# Patient Record
Sex: FEMALE | NOT HISPANIC OR LATINO
[De-identification: names, ages, dates, MRNs, and addresses within clinical notes are randomized per-mention and may not be internally consistent; named-entity substitution may affect disease eponyms.]

---

## 2017-12-26 ENCOUNTER — CHARTING TRANS (OUTPATIENT)
Dept: OTHER | Age: 22
End: 2017-12-26

## 2019-03-11 RX ORDER — MINOCYCLINE HYDROCHLORIDE 75 MG/1
CAPSULE ORAL
Qty: 60 CAPSULE | Refills: 0 | Status: SHIPPED | OUTPATIENT
Start: 2019-03-11

## 2019-10-07 ENCOUNTER — TRANSFERRED RECORDS (OUTPATIENT)
Dept: HEALTH INFORMATION MANAGEMENT | Facility: CLINIC | Age: 24
End: 2019-10-07

## 2019-10-09 ENCOUNTER — TRANSFERRED RECORDS (OUTPATIENT)
Dept: HEALTH INFORMATION MANAGEMENT | Facility: CLINIC | Age: 24
End: 2019-10-09

## 2019-10-18 ENCOUNTER — TRANSFERRED RECORDS (OUTPATIENT)
Dept: HEALTH INFORMATION MANAGEMENT | Facility: CLINIC | Age: 24
End: 2019-10-18

## 2019-10-23 ENCOUNTER — TELEPHONE (OUTPATIENT)
Dept: OTHER | Facility: CLINIC | Age: 24
End: 2019-10-23

## 2019-10-23 NOTE — TELEPHONE ENCOUNTER
Referral received via fax, notes available in nurse office.    Pt referred to [facility] by Dr. Bradshaw ([facility]) for thoracic outlet syndrome.    EMG report and MR brachial plexus report available in nurse office.  Contacted [facility] and requested MR brachial plexus to be pushed to .    Pt needs to be scheduled for consult with Dr. Tucker (per referring).  Will route to scheduling to coordinate an appointment at next available.    ROBERT Gipson RN

## 2019-10-23 NOTE — TELEPHONE ENCOUNTER
Left message on home number for patient to call back to schedule consult appointment with Dr. Tucker.

## 2019-11-01 NOTE — TELEPHONE ENCOUNTER
Attempted to contact pt to schedule consult with Dr. Tucker for TOS.  LVM stating this was our second and final attempt, however pt may call back and schedule consult at anytime if she is inclined.   Dagmar Lau, JOSÉ MIGUELN, RN  [facility] [place]

## 2019-12-05 ENCOUNTER — OFFICE VISIT (OUTPATIENT)
Dept: OTHER | Facility: CLINIC | Age: 24
End: 2019-12-05
Attending: SURGERY
Payer: COMMERCIAL

## 2019-12-05 VITALS — DIASTOLIC BLOOD PRESSURE: 76 MMHG | HEART RATE: 79 BPM | SYSTOLIC BLOOD PRESSURE: 117 MMHG

## 2019-12-05 DIAGNOSIS — G54.0 TOS (THORACIC OUTLET SYNDROME): Primary | ICD-10-CM

## 2019-12-05 PROCEDURE — 99204 OFFICE O/P NEW MOD 45 MIN: CPT | Mod: ZP | Performed by: SURGERY

## 2019-12-05 PROCEDURE — G0463 HOSPITAL OUTPT CLINIC VISIT: HCPCS

## 2019-12-05 RX ORDER — NORGESTIMATE AND ETHINYL ESTRADIOL 7DAYSX3 28
KIT ORAL
COMMUNITY
Start: 2016-07-07

## 2019-12-05 NOTE — NURSING NOTE
Jacqueline Loya is a 24 year old female who presents for:  Chief Complaint   Patient presents with     Consult     Pt referred to [facility] by Dr. Bradshaw ([facility]) for thoracic outlet syndrome        Vitals:    Vitals:    12/05/19 1432 12/05/19 1433   BP: 119/70 117/76   BP Location: Left arm Right arm   Patient Position: Chair Chair   Cuff Size: Adult Regular Adult Regular   Pulse: 82 79       BMI:  There is no height or weight on file to calculate BMI.    Pain Score:  Data Unavailable        Arely Wynne MA

## 2019-12-06 NOTE — PROGRESS NOTES
[place] [facility]    Jacqueline Loya was referred to see me today by Dr. Bradshaw for evaluation of neurogenic left thoracic outlet syndrome.  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  4 to 5 months ago she started noticing left arm tingling and numbness associated with positions.  She had no specific trauma or change in her swimming activities or conditioning.  Her symptoms continue to worsen to the point that she is quit swimming.  She will notice that her arm will have a tingling sensation more in the fourth and fifth digits initially then go up her arm with any arm elevation.  She oftentimes sleeps with her arms elevated above her head and will wake up with no sensation in the arm that does improve.    She did undergo physical therapy with no improvement.   @ [facility] saw her and ruled out joint pathology.  He also had an MRI performed of her left brachial plexus on 10/18/2019 which was normal.  No evidence of cervical pathology.  EMG was also performed on 10/9/2019 which was completely normal with no evidence of carpal tunnel syndrome or ulnar compression.      She tried physical therapy and this was not helpful.          Her symptoms have persisted and worsened affecting her daily activities.  She is right-handed and is no problems at all with the right arm.  No history of DVT nor arm swelling associated with this.    PMH: Medications: TriNessa birth control pills            No underlying medical problems.            Surgical: Repair of left ACL with good results.            No problems with anesthesia.  No bleeding issues.              Non-smoker.    FMH: Father had neurogenic TOS syndromes and required several surgeries.    ROS: Unremarkable.  Previously very active with swimming up to 2 hours daily.  Has never had a chest x-ray.         Exam: Very pleasant alert woman.  Normal affect.              Blood pressure 117/76  right arm and 119/70 left arm.  Pulse 82              HEENT= normal.              Chest= clear to auscultation.  Normal clavicular anatomy              Cardiovascular= regular rate               No right or left arm swelling.  No dilated cutaneous veins.                   With her left arm elevated 90 degrees and slightly externally rotated she immediately notices numbness to her fourth and fifth digits.  We also noticed a decrease in her radial pulse.  This became very bothersome after less than a minute and she needed to put her arm to her side and it took approximately a minute before the symptoms resolved.  This did not occur at all on her right arm.    Impression: Clinically patient has neurogenic left thoracic outlet syndrome that is quite symptomatic.  This was not associate with any traumatic event and there is no other obvious pathology.  No help with physical therapy is unlikely this is going to improve with time.  Clinically she has narrow thoracic outlet.             Would like to evaluate this further.  We will do formal thoracic outlet maneuvers.  If she does have compression of the of the subclavian artery and vein with maneuvers this just implies that she has a narrow thoracic outlet making compression of the brachial plexus much more likely.  However, some of our patients who truly have neurogenic TOS with no objective findings of compression even though it does exist.  We also will perform at chest x-ray to rule out a cervical rib which has a high association with the symptoms but is found in very few patients.               We discussed surgical intervention which I think is the appropriate pathway for this patient due to the extent of her symptoms.  I favor a transaxillary approach remove the first rib and excise the attachments of the anterior scalene and middle scalene muscle to relieve the compression on the brachial plexus.  I discussed the operative procedure with her and she had a good  understanding of this from her medical background.  We discussed the possibility of some temporary increased numbness to the ulnar nerve distribution due to traction necessary to remove the posterior lateral aspect of the rib it is usually temporary.  Also the chance of a pleural tear occurring.  We always leave a drain for 24 hours or longer.  Most of our patients are able to discharge in the first postoperative day though that is on an individual basis.  No specific restrictions to activities following this but only dependent upon comfort.  Also discussed in our 45-minute visit today but not all patients improved and she does have an understanding this with her father's history.      Ming Tucker MD  CC:  Dr Bradshaw @ [facility]

## 2019-12-10 ENCOUNTER — HOSPITAL ENCOUNTER (OUTPATIENT)
Dept: ULTRASOUND IMAGING | Facility: CLINIC | Age: 24
End: 2019-12-10
Attending: SURGERY
Payer: COMMERCIAL

## 2019-12-10 ENCOUNTER — HOSPITAL ENCOUNTER (OUTPATIENT)
Dept: ULTRASOUND IMAGING | Facility: CLINIC | Age: 24
Discharge: HOME | End: 2019-12-10
Attending: SURGERY | Admitting: SURGERY
Payer: COMMERCIAL

## 2019-12-10 DIAGNOSIS — G54.0 TOS (THORACIC OUTLET SYNDROME): ICD-10-CM

## 2019-12-10 PROCEDURE — 93970 EXTREMITY STUDY: CPT

## 2019-12-10 PROCEDURE — 93930 UPPER EXTREMITY STUDY: CPT

## 2019-12-12 ENCOUNTER — TELEPHONE (OUTPATIENT)
Dept: OTHER | Facility: CLINIC | Age: 24
End: 2019-12-12

## 2019-12-18 ENCOUNTER — TELEPHONE (OUTPATIENT)
Dept: OTHER | Facility: CLINIC | Age: 24
End: 2019-12-18

## 2019-12-18 NOTE — TELEPHONE ENCOUNTER
Surgery order obtained and given to surgery scheduler.  Dagmar Lau, JOSÉ MIGUELN, RN  [facility]

## 2019-12-18 NOTE — TELEPHONE ENCOUNTER
[place] [facility]    I spoke with Jacqueline Loya on the phone yesterday.  She has a very symptomatic neurogenic TOS by clinical findings.  She is tried physical therapy all other modalities and continues to have issues.    We did perform TOS maneuvers.  We do not see any obvious extrinsic compression of the subclavian vein and artery in any position.  I discussed this with the patient.  This does not necessarily mean that her brachial plexus is not getting compressed causing her neurogenic symptoms.    We do these testing since if they are markedly positive it does give some objective support to the clinical diagnosis.    Patient certainly has the ongoing chronic symptoms.  She would like to proceed with surgical treatment with a transaxillary first rib resection and scalenectomy.  Again reviewed the risks and benefits of the procedure with expected hospital stay.  She is well aware that this may not resolve her symptoms.    She would like to proceed with the surgery and will call to schedule the surgery at her convenience.    Ming Tucker MD

## 2020-03-10 ENCOUNTER — HEALTH MAINTENANCE LETTER (OUTPATIENT)
Age: 25
End: 2020-03-10

## 2020-12-27 ENCOUNTER — HEALTH MAINTENANCE LETTER (OUTPATIENT)
Age: 25
End: 2020-12-27

## 2021-04-24 ENCOUNTER — HEALTH MAINTENANCE LETTER (OUTPATIENT)
Age: 26
End: 2021-04-24

## 2021-10-04 ENCOUNTER — HEALTH MAINTENANCE LETTER (OUTPATIENT)
Age: 26
End: 2021-10-04

## 2022-05-15 ENCOUNTER — HEALTH MAINTENANCE LETTER (OUTPATIENT)
Age: 27
End: 2022-05-15

## 2022-09-11 ENCOUNTER — HEALTH MAINTENANCE LETTER (OUTPATIENT)
Age: 27
End: 2022-09-11

## 2023-06-03 ENCOUNTER — HEALTH MAINTENANCE LETTER (OUTPATIENT)
Age: 28
End: 2023-06-03